# Patient Record
Sex: MALE | Race: WHITE | URBAN - METROPOLITAN AREA
[De-identification: names, ages, dates, MRNs, and addresses within clinical notes are randomized per-mention and may not be internally consistent; named-entity substitution may affect disease eponyms.]

---

## 2022-12-05 NOTE — PROGRESS NOTES
Neurology Note    Patient ID:  Lindsey Sams  [id number]  16 y.o.  1975      Date of Consultation:  December 6, 2022    [facility]    Reason for Consultation: possible parkinson's disease      Assessment and Plan:    The patient is a pleasant 68-year-old gentleman with history of hypothyroidism/Graves' disease who presents to the neurology clinic for evaluation of a bilateral upper extremity tremor. His examination is notable for an essential tremor    Essential tremor: This is most likely associated with his hyperthyroidism/Graves' disease. There was no evidence of parkinson's disease  I will ensure there are no other possible causes of tremor and I will check a vitamin B 12 level and an ammonia level today. The remainder of his neurological examination today was normal which was reassuring to the patient. I did recommend that he have close follow-up with his physician at the facility and his endocrinologist in regards to his thyroid disease and monitoring/managing his dosing of medication with the hopes that his tremor improves as thyroid stabilizes. If after the thyroid treatment becomes stabilized and if the tremor is still present, medications can be considered include low-dose primidone or propranolol. All of the patient's questions were fully answered today         Subjective: I have a tremor     History of Present Illness:   Lindsey Sams is a 50 y.o. male who was referred to the neurology clinic at [facility] for an evaluation. The patient states that he has noticed a tremor for approximately 1 year. He states that there has been multiple medical conditions going on over the last year including him having lost close to 50 pounds. His weight has now since stabilized. He reports that he was started on methimazole earlier this year. He has been seen by endocrinology at [facility] for the diagnosis of hyperthyroidism.   The initial consultation was in July. At that visit his methimazole was increased to 20 mg. Additional serology was obtained. I do not have copy of those results for my review. He has not felt that the treatment has improved his tremor to any significance. He then had a follow-up visit in September 2022. It appeared that his methimazole was not increased and it was recommended to increase the methimazole at that time to either 15 or 20 mg. At that visit it was noted that his T3 level was 213 which is elevated. His T4 free level was also elevated at 2.1. His TSH level was 0.0 which was significantly reduced. It appears he did have an ultrasound of his thyroid performed on November 28 which revealed a heterogeneous hypervascular thyroid with lacy pattern. It was noted that these findings are nonspecific however can be seen in thyroiditis. The patient reports that he only notices the tremor in his hands. It does impact his handwriting. Certain other tasks such as holding a spoon can cause his hand to shake and for the object to fall out. Some days are worse than others. He does not know what may provoke or make the symptoms worse. He does not notice the tremor in his speech, head, or his lower extremities. He reports that he has been told that his cellmate states that he does have a tremor while he is sleeping. He states that his vision has gotten slightly worse and he has a difficult time with reading and finding the exact spot to hold an object. He denies any double vision. No bowel or bladder difficulty. No numbness or tingling. No weakness. Past Medical History:   Diagnosis Date    Graves disease    He reports that he did live at [facility] for many years growing up. Past surgical history: Tonsillectomy  Appendectomy  Ear surgery for cyst removal.      Family history: Mother with MS.   No tremor in the family   Father colon ca  Sister - breast cancer    Social History     Tobacco Use    Smoking status: Former     Types: Cigarettes     Quit date:      Years since quittin.9    Smokeless tobacco: Never   Substance Use Topics    Alcohol use: Not Currently        Allergies   Allergen Reactions    Penicillins Other (comments)     Unsure of reaction        Prior to Admission medications    Medication Sig Start Date End Date Taking? Authorizing Provider   methIMAzole (TAPAZOLE) 10 mg tablet 10 mg daily. Yes Provider, Historical   selenium sulfide (SELSUN BLUE) 1 % shampoo Apply  to affected area daily. Yes Provider, Historical   multivitamin (ONE A DAY) tablet Take 1 Tablet by mouth daily. Yes Provider, Historical       Review of Systems:    General, constitutional: negative  Eyes, vision: negative  Ears, nose, throat: negative  Cardiovascular, heart: negative  Respiratory: negative  Gastrointestinal: negative  Genitourinary: negative  Musculoskeletal: negative  Skin and integumentary: negative  Psychiatric: negative  Endocrine: negative  Neurological: negative, except for HPI  Hematologic/lymphatic: negative  Allergy/immunology: negative        Objective:     Visit Vitals  BP (!) 110/90 (BP 1 Location: Left upper arm, BP Patient Position: Sitting, BP Cuff Size: Large adult)   Pulse 76   Resp 15   Ht 5' 11\" (1.803 m)   Wt 190 lb (86.2 kg) Comment: per pt   SpO2 95%   BMI 26.50 kg/m²       Physical Exam:    General:  appears well nourished in no acute distress  Neck: no carotid bruits  Lungs: clear to auscultation  Heart:  no murmurs, regular rate  Lower extremity: peripheral pulses palpable and no edema  Skin: intact    Neurological exam:    Awake, alert, oriented to person, place and time  Recent and remote memory were normal  Attention and concentration were intact  Language was intact.   There was no aphasia  Speech: no dysarthria  Fund of knowledge was preserved    Cranial nerves:   II-XII were tested    Perrrla  Fundoscopic examination revealed venous pulsations and no clear abnormalities  Visual quiroga were full  Eomi, no evidence of nystagmus  Facial sensation:  normal and symmetric  Facial motor: normal and symmetric  Hearing intact  SCM strength intact  Tongue: midline without fasciculations    Motor: Tone normal.  There was no cogwheel rigidity  Pronator drift absent    No evidence of fasciculations    Strength testing:   deltoid triceps biceps Wrist ext. Wrist flex. intrinsics Hip flex. Hip ext. Knee ext. Knee flex Dorsi flex Plantar flex   Right 5 5 5 5 5 5 5 5 5 5 5 5   Left 5 5 5 5 5 5 5 5 5 5 5 5         Sensory:  Upper extremity: intact to pp, light touch, and vibration > 10 seconds  Lower extremity: intact to pp, light touch, and vibration > 10 seconds    Reflexes:    Right Left  Biceps  2 2  Triceps 2 2  Brachiorad. 2 2  Patella  2 2  Achilles 2 2    Plantar response:  flexor bilaterally      Cerebellar testing:  no resting tremor apparent, finger/nose and regi were intact. He does have an fine intention and action based tremor. Romberg: absent    Gait: steady. Heel, toe, and tandem gait were normal    Labs:     No results found for: HBA1C, NA, K, CL, GLU, BUN, CREA, CA, WBC, HCT, HGB, PLT, LDL, SFV7VEGW, HCTEXT, HGBEXT, PLTEXT, NFU1KVNL, HCTEXT, HGBEXT, PLTEXT    Imaging:    No results found for this or any previous visit. No results found for this or any previous visit. There is no problem list on file for this patient. The patient should return to clinic as needed    Renewed medication: None    I spent 60   minutes on the day of the encounter preparing the office visit by reviewing medical records, obtaining a history, performing examination, counseling and educating the patient on diagnosis, ordering tests, documenting in the clinical medical record, and coordinating the care for the patient. The patient had the ability to ask questions and all questions were answered.                  Signed By:  Dejuan Ariza DO FAAN    December 6, 2022

## 2022-12-06 ENCOUNTER — OFFICE VISIT (OUTPATIENT)
Dept: NEUROLOGY | Age: 47
End: 2022-12-06
Payer: COMMERCIAL

## 2022-12-06 VITALS
WEIGHT: 190 LBS | HEIGHT: 71 IN | OXYGEN SATURATION: 95 % | RESPIRATION RATE: 15 BRPM | HEART RATE: 76 BPM | DIASTOLIC BLOOD PRESSURE: 90 MMHG | SYSTOLIC BLOOD PRESSURE: 110 MMHG | BODY MASS INDEX: 26.6 KG/M2

## 2022-12-06 DIAGNOSIS — G25.0 ESSENTIAL TREMOR: Primary | ICD-10-CM

## 2022-12-06 PROCEDURE — 99205 OFFICE O/P NEW HI 60 MIN: CPT | Performed by: PSYCHIATRY & NEUROLOGY

## 2022-12-06 RX ORDER — BISMUTH SUBSALICYLATE 262 MG
1 TABLET,CHEWABLE ORAL DAILY
COMMUNITY

## 2022-12-06 RX ORDER — METHIMAZOLE 10 MG/1
10 TABLET ORAL DAILY
COMMUNITY

## 2022-12-06 NOTE — PROGRESS NOTES
1. Have you been to the ER, urgent care clinic since your last visit? Hospitalized since your last visit? No.    2. Have you seen or consulted any other health care providers outside of the [address] [place] [address] since your last visit? Include any pap smears or colon screening.    No.        Chief Complaint   Patient presents with    New Patient     ENT stated he has graves disease- tremors

## 2022-12-08 ENCOUNTER — TELEPHONE (OUTPATIENT)
Dept: NEUROLOGY | Age: 47
End: 2022-12-08

## 2022-12-08 NOTE — TELEPHONE ENCOUNTER
----- Message from aMyra Aguirre DO sent at 12/7/2022 12:35 PM EST -----  Please let patient know labs are normal.    Thanks.    ----- Message -----  From: James, Lab In [facility]  Sent: 12/7/2022  11:17 AM EST  To: Mayra Aguirre DO        Called pt, spoke to nurse Brad Manjarrez at facility, advised of pt's labs are normal. She asked I fax them to her at [phone]. She verbalized understanding. Faxed lab results to facility at [phone] and received fax confirmation.